# Patient Record
Sex: MALE | Employment: STUDENT | URBAN - METROPOLITAN AREA
[De-identification: names, ages, dates, MRNs, and addresses within clinical notes are randomized per-mention and may not be internally consistent; named-entity substitution may affect disease eponyms.]

---

## 2019-03-11 ENCOUNTER — WORRISOME GROWTH - SEE NOTE (OUTPATIENT)
Dept: URBAN - METROPOLITAN AREA CLINIC 36 | Facility: CLINIC | Age: 19
Setting detail: DERMATOLOGY
End: 2019-03-11

## 2019-03-11 DIAGNOSIS — L21.9 SEBORRHEIC DERMATITIS, UNSPECIFIED: ICD-10-CM

## 2019-03-11 PROCEDURE — 11306 SHAVE SKIN LESION 0.6-1.0 CM: CPT

## 2019-03-27 ENCOUNTER — OTHER - SEE NOTE (OUTPATIENT)
Dept: URBAN - METROPOLITAN AREA CLINIC 36 | Facility: CLINIC | Age: 19
Setting detail: DERMATOLOGY
End: 2019-03-27

## 2019-03-27 DIAGNOSIS — Z41.9 ENCOUNTER FOR PROCEDURE FOR PURPOSES OTHER THAN REMEDYING HEALTH STATE, UNSPECIFIED: ICD-10-CM

## 2019-03-27 PROCEDURE — 11104 PUNCH BX SKIN SINGLE LESION: CPT

## 2019-03-29 ENCOUNTER — FOLLOW UP (OUTPATIENT)
Dept: URBAN - METROPOLITAN AREA CLINIC 36 | Facility: CLINIC | Age: 19
Setting detail: DERMATOLOGY
End: 2019-03-29

## 2019-03-29 DIAGNOSIS — L29.9 PRURITUS, UNSPECIFIED: ICD-10-CM

## 2019-03-29 PROCEDURE — OTHER NO CHARGE OFFICE VISIT: OTHER

## 2023-02-13 ENCOUNTER — NEW PATIENT (OUTPATIENT)
Dept: URBAN - METROPOLITAN AREA CLINIC 19 | Facility: CLINIC | Age: 23
End: 2023-02-13

## 2023-02-13 DIAGNOSIS — H35.462: ICD-10-CM

## 2023-02-13 DIAGNOSIS — H43.393: ICD-10-CM

## 2023-02-13 DIAGNOSIS — H33.322: ICD-10-CM

## 2023-02-13 PROCEDURE — 92134 CPTRZ OPH DX IMG PST SGM RTA: CPT

## 2023-02-13 PROCEDURE — 99244 OFF/OP CNSLTJ NEW/EST MOD 40: CPT

## 2023-02-13 PROCEDURE — 92201 OPSCPY EXTND RTA DRAW UNI/BI: CPT

## 2023-02-13 ASSESSMENT — TONOMETRY
OD_IOP_MMHG: 14
OS_IOP_MMHG: 15

## 2023-02-13 ASSESSMENT — VISUAL ACUITY
OS_CC: 20/20
OD_CC: 20/20

## 2023-05-28 ENCOUNTER — EMERGENCY (EMERGENCY)
Facility: HOSPITAL | Age: 23
LOS: 0 days | Discharge: HOME | End: 2023-05-28
Attending: STUDENT IN AN ORGANIZED HEALTH CARE EDUCATION/TRAINING PROGRAM
Payer: COMMERCIAL

## 2023-05-28 VITALS
TEMPERATURE: 98 F | HEART RATE: 115 BPM | DIASTOLIC BLOOD PRESSURE: 65 MMHG | RESPIRATION RATE: 24 BRPM | SYSTOLIC BLOOD PRESSURE: 140 MMHG | OXYGEN SATURATION: 99 %

## 2023-05-28 VITALS — WEIGHT: 179.9 LBS | HEIGHT: 68 IN
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
## 2023-05-28 DIAGNOSIS — M25.511 PAIN IN RIGHT SHOULDER: ICD-10-CM
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
## 2023-05-28 DIAGNOSIS — S43.004A UNSPECIFIED DISLOCATION OF RIGHT SHOULDER JOINT, INITIAL ENCOUNTER: ICD-10-CM

## 2023-05-28 PROCEDURE — 23650 CLTX SHO DSLC W/MNPJ WO ANES: CPT | Mod: RT

## 2023-05-28 PROCEDURE — 73030 X-RAY EXAM OF SHOULDER: CPT | Mod: RT

## 2023-05-28 PROCEDURE — 23650 CLTX SHO DSLC W/MNPJ WO ANES: CPT | Mod: 54

## 2023-05-28 PROCEDURE — 73030 X-RAY EXAM OF SHOULDER: CPT | Mod: 26,RT

## 2023-05-28 PROCEDURE — 99283 EMERGENCY DEPT VISIT LOW MDM: CPT | Mod: 25

## 2023-05-28 PROCEDURE — 99284 EMERGENCY DEPT VISIT MOD MDM: CPT | Mod: 57

## 2023-05-28 NOTE — ED ADULT TRIAGE NOTE - CHIEF COMPLAINT QUOTE
Pt presents to ED c/o shoulder dislocation. Pt states he was going to spike a ball and his right shoulder popped out of the socket. Pt endorsing numbness and tingling in his fingers. Pt pain is 10/10. NKDA.

## 2023-05-28 NOTE — ED STATDOCS - NSFOLLOWUPINSTRUCTIONS_ED_ALL_ED_FT
[place] Dislocation  Three images of the anatomy of the shoulder. One is normal. The other two are dislocated.  Your shoulder joint is made up of 3 bones:  The upper arm bone (humerus).  The shoulder blade (scapula).  The collarbone (clavicle).  A shoulder dislocation happens when your upper arm bone moves out of its normal place in your shoulder joint.    What are the causes?  This condition is often caused by:  A fall.  A hard, direct hit to the shoulder.  A forceful movement of the shoulder.  What increases the risk?  You are more likely to develop this condition if you play sports.    What are the signs or symptoms?  The upper body showing a dislocated shoulder.  Bad shape (deformity) of the shoulder.  Very bad pain.  A shoulder that you cannot move.  Numbness, weakness, or tingling in your neck or down your arm.  Bruising or swelling around your shoulder.  How is this treated?  This condition is treated with a procedure called a reduction. This is done to place the upper arm bone back in the joint. There are two types of reduction:  Closed reduction. The upper arm bone is placed back in the joint without surgery. The doctor uses his or her hands to guide the bone back into place.  Open reduction. Surgery is done to place the upper arm bone back in the joint. This may be needed if:  You have a weak shoulder joint or weak tissues that connect bones to each other (ligaments).  You have had more than one shoulder dislocation.  The nerves or blood vessels around your shoulder have been damaged.  After the procedure, you will wear a brace or sling to prevent the arm from moving.    After the brace or sling is removed, you will have physical therapy to help improve movement (range of motion) in your shoulder joint.    Follow these instructions at home:  Medicines    Take over-the-counter and prescription medicines only as told by your doctor.  Ask your doctor if the medicine prescribed to you:  Requires you to avoid driving or using machinery.  Can cause trouble pooping (constipation). You may need to take steps to prevent or treat trouble pooping:  Drink enough fluid to keep your pee (urine) pale yellow.  Take over-the-counter or prescription medicines.  Eat foods that are high in fiber. These include beans, whole grains, and fresh fruits and vegetables.  Limit foods that are high in fat and sugar. These include fried or sweet foods.  If you have a brace or sling:    Wear the brace or sling as told by your doctor. Remove it only as told by your doctor.  Loosen the brace or sling if your fingers:  Tingle.  Become numb.  Turn cold or blue.  Keep the brace or sling clean.  If the brace or sling is not waterproof:  Do not let it get wet.  Cover it with a watertight covering when you take a bath or shower.  Managing pain, stiffness, and swelling    Bag of ice on a towel on the skin.  If told, put ice on the injured area.  If you can remove your brace or sling, remove it as told by your doctor.  Put ice in a plastic bag.  Place a towel between your skin and the bag.  Leave the ice on for 20 minutes, 2–3 times per day.  Move your fingers often.  Raise (elevate) the injured area above the level of your heart while you are sitting or lying down.  Activity    Do not lift your arm above shoulder level until your doctor approves.  Do not lift anything until your doctor says that it is safe.  Do not push or pull things until your doctor approves.  Return to your normal activities as told by your doctor. Ask your doctor what activities are safe for you.  Do range-of-motion exercises only as told by your doctor.  Exercise your hand by squeezing a soft ball. This keeps your hand and wrist from getting stiff and swollen.  General instructions    Do not drive while you are wearing a brace or sling on a hand that you use for driving.  Do not take baths, swim, or use a hot tub until your doctor approves. Ask your doctor if you may take showers. You may only be allowed to take sponge baths.  Do not use any tobacco products, including cigarettes, chewing tobacco, or e-cigarettes. These can delay healing. If you need help quitting, ask your doctor.  Keep all follow-up visits as told by your doctor. This is important.  Contact a doctor if:  Your brace or sling gets damaged.  Get help right away if:  Your pain gets worse, not better.  You lose feeling in your arm or hand.  Your arm or hand turns white and cold.  Summary  A shoulder dislocation happens when your upper arm bone moves out of its normal place in your shoulder joint.  It is often caused by a fall, a strong hit to the shoulder, or a forceful movement of the shoulder.  It causes very bad pain. You may not be able to move your shoulder.  This condition is treated with either closed or open reduction. You will also be given a brace or sling. You will do exercises to improve movement in your shoulder joint.  Contact a doctor if your brace or sling gets damaged. Get help right away if your pain gets worse, you lose feeling in your arm or hand, or your arm or hand turns white or cold.  This information is not intended to replace advice given to you by your health care provider. Make sure you discuss any questions you have with your health care provider.    How to Use a Sling    WHAT YOU NEED TO KNOW:    A sling is a strong fabric loop that hangs from your neck to support your arm. Your arm, bent at the elbow, rests in the sling. Some slings have a strap that goes down your back to take the weight off your neck. This strap is connected to the elbow side of the sling. Your healthcare provider will help you decide which sling is best for you and where you can get one. A sling helps prevent your hand, arm, and shoulder from moving so your injury can heal. A sling can also help if you have a heavy cast on your arm.Shoulder Sling         DISCHARGE INSTRUCTIONS:    How to use a sling: Your healthcare provider will teach you how to put on your sling. Follow the directions below to help you put on the sling at home:     Gently bend your injured arm at the elbow and hold it in front of you, across your body. Your thumb should be pointing up.      Put the strap of the sling around your neck. The strap usually has an adhesive fabric to make it easy for you to fasten the strap.      Put your arm in the sling so your elbow is in the closed end of the sling. Your hand will be at the open end of the sling.      Put the edge of the sling so it covers the first fold of the little finger.       Wiggle your fingers as directed to prevent stiffness in your hand.

## 2023-05-28 NOTE — ED STATDOCS - PHYSICAL EXAMINATION
Constitutional: Awake, Alert, non-toxic. No acute distress. Well appearing, well nourished.   HEAD: Normocephalic, atraumatic.   EYES: EOM intact, conjunctiva and sclera are clear bilaterally.  ENT: External ears normal. No rhinorrhea, no tracheal deviation   NECK: Supple, non-tender  CARDIOVASCULAR: regular rate  RESPIRATORY: Normal respiratory effort; Speaking in full sentences. No accessory muscle use.   MSK:  no lower extremity edema, right shoulder is squared off, intact radial pulse, no pain to wrist or elbow  SKIN: Warm, dry  NEURO: A&O x3. Sensory and motor functions are grossly intact. Speech is normal.  PSYCH: Appearance and judgement seem appropriate for gender and age.

## 2023-05-28 NOTE — ED STATDOCS - PATIENT PORTAL LINK FT
You can access the FollowMyHealth Patient Portal offered by [facility] by registering at the following website: [email or web address]. By joining [facility]’s FollowMyHealth portal, you will also be able to view your health information using other applications (apps) compatible with our system.

## 2023-05-28 NOTE — ED STATDOCS - NS ED ATTENDING STATEMENT MOD
This was a shared visit with the LIS. I reviewed and verified the documentation and independently performed the documented:

## 2023-05-28 NOTE — ED STATDOCS - CARE PROVIDER_API CALL
Madhav Swain  Orthopaedic Surgery  [address] [place] [address]  Phone: [phone]  Fax: [phone]  Follow Up Time:

## 2023-05-28 NOTE — ED STATDOCS - CLINICAL SUMMARY MEDICAL DECISION MAKING FREE TEXT BOX
Patient with concern for dislocation based on history and exam.  At bedside able to massage the shoulder and pulled traction.  Afterwards range of motion improved, neurovascular intact.  Likely reduced at bedside.  Will obtain x-rays to rule out fracture.

## 2023-05-28 NOTE — ED STATDOCS - PROGRESS NOTE DETAILS
HR improved to 92.  xray with shoulder reduced, no fx.  Dr. Huffman placed patient in sling.  advised orthopedic follow up -Holland Woodruff PA-C

## 2023-05-28 NOTE — ED STATDOCS - ATTENDING APP SHARED VISIT CONTRIBUTION OF CARE
I, Phi Huffman MD,  performed the initial face to face bedside interview with this patient regarding history of present illness, review of symptoms and relevant past medical, social and family history.  I completed an independent physical examination.  I was the initial provider who evaluated this patient.   I personally saw the patient and performed a substantive portion of the visit including all aspects of the medical decision making.  I have signed out the follow up of any pending tests (i.e. labs, radiological studies) to the LIS.  I have communicated the patient’s plan of care and disposition with the LIS.

## 2023-05-28 NOTE — ED STATDOCS - OBJECTIVE STATEMENT
Patient with no significant past medical history is presenting with right shoulder pain.  While playing volleyball states that he is try to hit a ball but missed and fell like his right shoulder popped out.  No history of dislocations before.  Happened about 1 hour ago.  Did not fall down and hit his head.  Denies other injuries or pain.

## 2023-05-29 ENCOUNTER — EMERGENCY (EMERGENCY)
Facility: HOSPITAL | Age: 23
LOS: 0 days | Discharge: LEFT BEFORE DISCHARGE | End: 2023-05-29
Payer: COMMERCIAL

## 2023-05-29 VITALS
DIASTOLIC BLOOD PRESSURE: 58 MMHG | RESPIRATION RATE: 17 BRPM | SYSTOLIC BLOOD PRESSURE: 135 MMHG | HEART RATE: 80 BPM | TEMPERATURE: 99 F | OXYGEN SATURATION: 100 %

## 2023-05-29 VITALS — HEIGHT: 68 IN | WEIGHT: 169.98 LBS

## 2023-05-29 DIAGNOSIS — Z53.21 PROCEDURE AND TREATMENT NOT CARRIED OUT DUE TO PATIENT LEAVING PRIOR TO BEING SEEN BY HEALTH CARE PROVIDER: ICD-10-CM

## 2023-05-29 DIAGNOSIS — M25.511 PAIN IN RIGHT SHOULDER: ICD-10-CM

## 2023-05-29 PROCEDURE — L9991: CPT

## 2023-05-29 NOTE — ED ADULT TRIAGE NOTE - CHIEF COMPLAINT QUOTE
Pt presents to er with complaints of right sided shoulder pain, states he was here last night and shoulder was reduced, states he feels like it's not back in the socket and requesting further evaluation with an increase in pain.